# Patient Record
Sex: MALE | Race: BLACK OR AFRICAN AMERICAN
[De-identification: names, ages, dates, MRNs, and addresses within clinical notes are randomized per-mention and may not be internally consistent; named-entity substitution may affect disease eponyms.]

---

## 2018-05-29 ENCOUNTER — HOSPITAL ENCOUNTER (OUTPATIENT)
Dept: HOSPITAL 61 - KCIC MRI | Age: 78
Discharge: HOME | End: 2018-05-29
Attending: INTERNAL MEDICINE
Payer: MEDICARE

## 2018-05-29 DIAGNOSIS — R41.3: Primary | ICD-10-CM

## 2018-05-29 PROCEDURE — 70450 CT HEAD/BRAIN W/O DYE: CPT

## 2021-06-19 ENCOUNTER — HOSPITAL ENCOUNTER (INPATIENT)
Dept: HOSPITAL 61 - ER | Age: 81
LOS: 1 days | Discharge: HOME HEALTH | DRG: 73 | End: 2021-06-20
Attending: INTERNAL MEDICINE | Admitting: INTERNAL MEDICINE
Payer: MEDICARE

## 2021-06-19 VITALS — DIASTOLIC BLOOD PRESSURE: 51 MMHG | SYSTOLIC BLOOD PRESSURE: 102 MMHG

## 2021-06-19 VITALS — DIASTOLIC BLOOD PRESSURE: 45 MMHG | SYSTOLIC BLOOD PRESSURE: 115 MMHG

## 2021-06-19 VITALS — HEIGHT: 71 IN | BODY MASS INDEX: 20.96 KG/M2 | WEIGHT: 149.69 LBS

## 2021-06-19 VITALS — DIASTOLIC BLOOD PRESSURE: 72 MMHG | SYSTOLIC BLOOD PRESSURE: 129 MMHG

## 2021-06-19 VITALS — SYSTOLIC BLOOD PRESSURE: 91 MMHG | DIASTOLIC BLOOD PRESSURE: 42 MMHG

## 2021-06-19 VITALS — DIASTOLIC BLOOD PRESSURE: 56 MMHG | SYSTOLIC BLOOD PRESSURE: 121 MMHG

## 2021-06-19 VITALS — DIASTOLIC BLOOD PRESSURE: 62 MMHG | SYSTOLIC BLOOD PRESSURE: 123 MMHG

## 2021-06-19 VITALS — DIASTOLIC BLOOD PRESSURE: 65 MMHG | SYSTOLIC BLOOD PRESSURE: 132 MMHG

## 2021-06-19 DIAGNOSIS — M47.816: ICD-10-CM

## 2021-06-19 DIAGNOSIS — H40.9: ICD-10-CM

## 2021-06-19 DIAGNOSIS — Z82.49: ICD-10-CM

## 2021-06-19 DIAGNOSIS — M19.011: ICD-10-CM

## 2021-06-19 DIAGNOSIS — I25.5: ICD-10-CM

## 2021-06-19 DIAGNOSIS — Z82.5: ICD-10-CM

## 2021-06-19 DIAGNOSIS — M16.11: ICD-10-CM

## 2021-06-19 DIAGNOSIS — I11.0: ICD-10-CM

## 2021-06-19 DIAGNOSIS — Z95.5: ICD-10-CM

## 2021-06-19 DIAGNOSIS — M19.042: ICD-10-CM

## 2021-06-19 DIAGNOSIS — M19.041: ICD-10-CM

## 2021-06-19 DIAGNOSIS — Z83.3: ICD-10-CM

## 2021-06-19 DIAGNOSIS — E53.8: ICD-10-CM

## 2021-06-19 DIAGNOSIS — G93.41: ICD-10-CM

## 2021-06-19 DIAGNOSIS — I65.22: ICD-10-CM

## 2021-06-19 DIAGNOSIS — I65.29: ICD-10-CM

## 2021-06-19 DIAGNOSIS — I50.42: ICD-10-CM

## 2021-06-19 DIAGNOSIS — E78.5: ICD-10-CM

## 2021-06-19 DIAGNOSIS — I25.10: ICD-10-CM

## 2021-06-19 DIAGNOSIS — F03.91: ICD-10-CM

## 2021-06-19 DIAGNOSIS — Z95.810: ICD-10-CM

## 2021-06-19 DIAGNOSIS — I25.2: ICD-10-CM

## 2021-06-19 DIAGNOSIS — D69.6: ICD-10-CM

## 2021-06-19 DIAGNOSIS — G90.8: Primary | ICD-10-CM

## 2021-06-19 LAB
ALBUMIN SERPL-MCNC: 4.6 G/DL (ref 3.4–5)
ALBUMIN/GLOB SERPL: 1.5 {RATIO} (ref 1–1.7)
ALP SERPL-CCNC: 70 U/L (ref 46–116)
ALT SERPL-CCNC: 29 U/L (ref 16–63)
ANION GAP SERPL CALC-SCNC: 13 MMOL/L (ref 6–14)
APTT PPP: YELLOW S
AST SERPL-CCNC: 24 U/L (ref 15–37)
BACTERIA #/AREA URNS HPF: (no result) /HPF
BASOPHILS # BLD AUTO: 0 X10^3/UL (ref 0–0.2)
BASOPHILS NFR BLD: 1 % (ref 0–3)
BILIRUB SERPL-MCNC: 1.5 MG/DL (ref 0.2–1)
BILIRUB UR QL STRIP: NEGATIVE
BUN SERPL-MCNC: 22 MG/DL (ref 8–26)
BUN/CREAT SERPL: 20 (ref 6–20)
CALCIUM SERPL-MCNC: 9.7 MG/DL (ref 8.5–10.1)
CHLORIDE SERPL-SCNC: 104 MMOL/L (ref 98–107)
CO2 SERPL-SCNC: 25 MMOL/L (ref 21–32)
CREAT SERPL-MCNC: 1.1 MG/DL (ref 0.7–1.3)
EOSINOPHIL NFR BLD: 0.1 X10^3/UL (ref 0–0.7)
EOSINOPHIL NFR BLD: 2 % (ref 0–3)
ERYTHROCYTE [DISTWIDTH] IN BLOOD BY AUTOMATED COUNT: 13.3 % (ref 11.5–14.5)
FIBRINOGEN PPP-MCNC: CLEAR MG/DL
GFR SERPLBLD BASED ON 1.73 SQ M-ARVRAT: 78.1 ML/MIN
GLUCOSE SERPL-MCNC: 108 MG/DL (ref 70–99)
HCT VFR BLD CALC: 42.7 % (ref 39–53)
HGB BLD-MCNC: 14.9 G/DL (ref 13–17.5)
LYMPHOCYTES # BLD: 2.7 X10^3/UL (ref 1–4.8)
LYMPHOCYTES NFR BLD AUTO: 33 % (ref 24–48)
MCH RBC QN AUTO: 32 PG (ref 25–35)
MCHC RBC AUTO-ENTMCNC: 35 G/DL (ref 31–37)
MCV RBC AUTO: 93 FL (ref 79–100)
MONO #: 0.7 X10^3/UL (ref 0–1.1)
MONOCYTES NFR BLD: 9 % (ref 0–9)
NEUT #: 4.7 X10^3/UL (ref 1.8–7.7)
NEUTROPHILS NFR BLD AUTO: 57 % (ref 31–73)
NITRITE UR QL STRIP: NEGATIVE
PH UR STRIP: 6 [PH]
PLATELET # BLD AUTO: 119 X10^3/UL (ref 140–400)
POTASSIUM SERPL-SCNC: 4.4 MMOL/L (ref 3.5–5.1)
PROT SERPL-MCNC: 7.7 G/DL (ref 6.4–8.2)
PROT UR STRIP-MCNC: NEGATIVE MG/DL
RBC # BLD AUTO: 4.6 X10^6/UL (ref 4.3–5.7)
RBC #/AREA URNS HPF: (no result) /HPF (ref 0–2)
SODIUM SERPL-SCNC: 142 MMOL/L (ref 136–145)
UROBILINOGEN UR-MCNC: 0.2 MG/DL
WBC # BLD AUTO: 8.2 X10^3/UL (ref 4–11)
WBC #/AREA URNS HPF: (no result) /HPF (ref 0–4)

## 2021-06-19 PROCEDURE — 80053 COMPREHEN METABOLIC PANEL: CPT

## 2021-06-19 PROCEDURE — 81001 URINALYSIS AUTO W/SCOPE: CPT

## 2021-06-19 PROCEDURE — 93880 EXTRACRANIAL BILAT STUDY: CPT

## 2021-06-19 PROCEDURE — 82962 GLUCOSE BLOOD TEST: CPT

## 2021-06-19 PROCEDURE — 83735 ASSAY OF MAGNESIUM: CPT

## 2021-06-19 PROCEDURE — 93005 ELECTROCARDIOGRAM TRACING: CPT

## 2021-06-19 PROCEDURE — G0378 HOSPITAL OBSERVATION PER HR: HCPCS

## 2021-06-19 PROCEDURE — 70450 CT HEAD/BRAIN W/O DYE: CPT

## 2021-06-19 PROCEDURE — P9612 CATHETERIZE FOR URINE SPEC: HCPCS

## 2021-06-19 PROCEDURE — 85025 COMPLETE CBC W/AUTO DIFF WBC: CPT

## 2021-06-19 PROCEDURE — 87086 URINE CULTURE/COLONY COUNT: CPT

## 2021-06-19 PROCEDURE — 80048 BASIC METABOLIC PNL TOTAL CA: CPT

## 2021-06-19 PROCEDURE — 84484 ASSAY OF TROPONIN QUANT: CPT

## 2021-06-19 PROCEDURE — 36415 COLL VENOUS BLD VENIPUNCTURE: CPT

## 2021-06-19 RX ADMIN — LISINOPRIL SCH MG: 10 TABLET ORAL at 11:00

## 2021-06-19 RX ADMIN — CARVEDILOL SCH MG: 12.5 TABLET, FILM COATED ORAL at 17:00

## 2021-06-19 RX ADMIN — TAMSULOSIN HYDROCHLORIDE SCH MG: 0.4 CAPSULE ORAL at 17:17

## 2021-06-19 RX ADMIN — MULTIPLE VITAMINS W/ MINERALS TAB SCH TAB: TAB at 11:19

## 2021-06-19 RX ADMIN — Medication SCH MG: at 14:24

## 2021-06-19 RX ADMIN — Medication SCH MG: at 22:00

## 2021-06-19 RX ADMIN — VITAMIN D, TAB 1000IU (100/BT) SCH UNIT: 25 TAB at 11:20

## 2021-06-19 RX ADMIN — CARVEDILOL SCH MG: 12.5 TABLET, FILM COATED ORAL at 13:31

## 2021-06-19 RX ADMIN — ASPIRIN 81 MG SCH MG: 81 TABLET ORAL at 11:20

## 2021-06-19 RX ADMIN — CYANOCOBALAMIN TAB 1000 MCG SCH MCG: 1000 TAB at 11:19

## 2021-06-19 RX ADMIN — MEMANTINE HYDROCHLORIDE SCH MG: 5 TABLET ORAL at 21:00

## 2021-06-19 RX ADMIN — LISINOPRIL SCH MG: 10 TABLET ORAL at 21:00

## 2021-06-19 RX ADMIN — MEMANTINE HYDROCHLORIDE SCH MG: 5 TABLET ORAL at 13:30

## 2021-06-19 RX ADMIN — CETIRIZINE HYDROCHLORIDE SCH MG: 10 TABLET, FILM COATED ORAL at 11:25

## 2021-06-19 NOTE — PDOC2
CONSULT


Date of Consult


Date of Consult


DATE: 6/19/21 


TIME: 20:35





Reason for Consult


Reason for Consult:


AMS





Identification/Chief Complaint


Chief Complaint


AMS





History of Present Illness


Reason for Visit:





This patient is 80-year-old man with past medical history of multiple medical 

problems with history of dementia, episodes of agitation.  Patient was not 

feeling well for the last two-to-three days prior to presentation.  Patient was 

having more confusion.  Patient had a syncope episode.  Patient had improvement 

in symptoms.  Patient currently denies any complaint of headache nausea or 

vomiting chest pain shortness of breath.  Patient denied any focal extremity 

weakness.  Patient had a CT scan done brain did not show any evidence of acute 

intracranial etiology changes noted for chronic small vessel ischemic disease, 

atrophy noted





Current Problem List


Problem List


Problems


Medical Problems:


(1) Altered mental status


Status: Acute  











Current Medications


Current Medications





Current Medications


Aspirin (Aspirin Chewable) 81 mg DAILY PO  Last administered on 6/19/21at 11:20;

 Start 6/19/21 at 11:00


Cetirizine HCl (ZyrTEC) 10 mg DAILY PO  Last administered on 6/19/21at 11:25;  

Start 6/19/21 at 11:00


Donepezil HCl (Aricept) 10 mg HS PO ;  Start 6/19/21 at 21:00;  Stop 6/19/21 at 

12:10;  Status DC


Latanoprost (Xalatan) 1 drop QHS OU ;  Start 6/19/21 at 21:00


Lisinopril (Prinivil) 10 mg BID PO ;  Start 6/19/21 at 11:00


Simvastatin (Zocor) 20 mg HS PO ;  Start 6/19/21 at 21:00


Tamsulosin HCl (Flomax) 0.4 mg DAILYWSUP PO  Last administered on 6/19/21at 

17:17;  Start 6/19/21 at 17:00


Carvedilol (Coreg) 25 mg BIDWMEALS PO  Last administered on 6/19/21at 13:31;  

Start 6/19/21 at 12:00


Cyanocobalamin (Vitamin B-12) 1,000 mcg DAILY PO  Last administered on 6/19/21at

11:19;  Start 6/19/21 at 11:00


Non-Formulary Medication (Mexiletine Hcl ) 150 mg Q8HRS PO  Last administered on

6/19/21at 14:24;  Start 6/19/21 at 14:00


Multivitamins (Thera M Plus) 1 tab DAILY PO  Last administered on 6/19/21at 

11:19;  Start 6/19/21 at 11:00


Vitamin D (Vitamin D3) 2,000 unit DAILY PO ;  Start 6/20/21 at 09:00;  Stop 

6/19/21 at 10:07;  Status DC


Vitamin D (Vitamin D3) 2,000 unit DAILY PO  Last administered on 6/19/21at 

11:20;  Start 6/19/21 at 10:15


Donepezil HCl (Aricept) 20 mg HS PO ;  Start 6/19/21 at 21:00


Memantine (Namenda) 5 mg BID PO  Last administered on 6/19/21at 13:30;  Start 

6/19/21 at 13:00


Alprazolam (Xanax) 0.25 mg PRN Q8HRS  PRN PO ANXIETY / AGITATION Last 

administered on 6/19/21at 17:17;  Start 6/19/21 at 16:30





Active Scripts


Active


Reported


Vitamin D3 ** (Vitamin D) 25 Mcg Tablet 2,000 Intlu PO DAILY


     1,000 UNITS = 25 MCG


Cetirizine Hcl 10 Mg Tablet 10 Mg PO DAILY


Lisinopril 10 Mg Tablet 10 Mg PO BID


Flomax (Tamsulosin Hcl) 0.4 Mg Cap.er.24h 0.4 Mg PO DAILYWSUP


Flomax (Tamsulosin Hcl) 0.4 Mg Cap.er.24h 0.4 Mg PO DAILY


Mexiletine Hcl 150 Mg Capsule 150 Mg PO TID


Xalatan (Latanoprost) 2.5 Ml Drops 1 Drop OU QHS


Carvedilol 25 Mg Tablet 25 Mg PO BIDWMEALS


Donepezil Hcl 10 Mg Tablet 10 Mg PO HS


Simvastatin 20 Mg Tablet 20 Mg PO HS


Vitamin B-12 (Cyanocobalamin (Vitamin B-12)) 1,000 Mcg Capsule 1 Cap PO DAILY 30

Days


Centrum Silver Men Tablet (Multivit-Min/FA/Lycopen/Lutein) 1 Each Tablet 1 Each 

PO DAILY


Aspirin 81 Mg Tab.chew 81 Mg PO DAILY





Allergies


Allergies:  


Coded Allergies:  


     No Known Drug Allergies (Unverified , 6/19/21)





Physical Exam


Physical Exam


General no acute distress.


HEENT: Normocephalic and atraumatic. 


NECK: Supple without bruit


Respiratory: Clear to auscultation bilaterally


Heart: Regular rate and rhythm, S1S2 normal


NEUROLOGIC: Mental status Alert Able to tell his name.  No meningeal signs.


Cranial nerve equally reactive pupils, and intact extraocular movements. 


No facial asymmetry. 


Palate elevates and tongue protrudes in midline. 


Reflexes are 1-2 with flexor plantar responses. 


Coordination no dysmetria


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 


Sensory exam is intact for light touch and pinprick.


Gait in bed.


A 10-point review of systems was obtained. Other than the history of present 

illness the remainder of the review of systems is negative.





Vitals


VITALS





Vital Signs








  Date Time  Temp Pulse Resp B/P (MAP) Pulse Ox O2 Delivery O2 Flow Rate FiO2


 


6/19/21 19:15 97.9 68 18 91/42 (58) 98 Room Air  





 97.9       











Labs


Labs





Laboratory Tests








Test


 6/19/21


03:50 6/19/21


03:54 6/19/21


04:00


 


Urine Collection Type U cath   


 


Urine Color Yellow   


 


Urine Clarity Clear   


 


Urine pH 6.0 (<5.0-8.0)   


 


Urine Specific Gravity


 >=1.030


(1.000-1.030) 


 





 


Urine Protein


 Negative mg/dL


(NEG-TRACE) 


 





 


Urine Glucose (UA)


 Negative mg/dL


(NEG) 


 





 


Urine Ketones (Stick)


 Negative mg/dL


(NEG) 


 





 


Urine Blood Negative (NEG)   


 


Urine Nitrite Negative (NEG)   


 


Urine Bilirubin Negative (NEG)   


 


Urine Urobilinogen Dipstick


 0.2 mg/dL (0.2


mg/dL) 


 





 


Urine Leukocyte Esterase Trace (NEG)   


 


Urine RBC 1-2 /HPF (0-2)   


 


Urine WBC


 5-10 /HPF


(0-4) 


 





 


Urine Squamous Epithelial


Cells Mod /LPF 


 


 





 


Urine Bacteria


 Moderate /HPF


(0-FEW) 


 





 


Urine Mucus Mod /LPF   


 


Glucose (Fingerstick)


 


 96 mg/dL


(70-99) 





 


White Blood Count


 


 


 8.2 x10^3/uL


(4.0-11.0)


 


Red Blood Count


 


 


 4.60 x10^6/uL


(4.30-5.70)


 


Hemoglobin


 


 


 14.9 g/dL


(13.0-17.5)


 


Hematocrit


 


 


 42.7 %


(39.0-53.0)


 


Mean Corpuscular Volume   93 fL () 


 


Mean Corpuscular Hemoglobin   32 pg (25-35) 


 


Mean Corpuscular Hemoglobin


Concent 


 


 35 g/dL


(31-37)


 


Red Cell Distribution Width


 


 


 13.3 %


(11.5-14.5)


 


Platelet Count


 


 


 119 x10^3/uL


(140-400)


 


Neutrophils (%) (Auto)   57 % (31-73) 


 


Lymphocytes (%) (Auto)   33 % (24-48) 


 


Monocytes (%) (Auto)   9 % (0-9) 


 


Eosinophils (%) (Auto)   2 % (0-3) 


 


Basophils (%) (Auto)   1 % (0-3) 


 


Neutrophils # (Auto)


 


 


 4.7 x10^3/uL


(1.8-7.7)


 


Lymphocytes # (Auto)


 


 


 2.7 x10^3/uL


(1.0-4.8)


 


Monocytes # (Auto)


 


 


 0.7 x10^3/uL


(0.0-1.1)


 


Eosinophils # (Auto)


 


 


 0.1 x10^3/uL


(0.0-0.7)


 


Basophils # (Auto)


 


 


 0.0 x10^3/uL


(0.0-0.2)


 


Sodium Level


 


 


 142 mmol/L


(136-145)


 


Potassium Level


 


 


 4.4 mmol/L


(3.5-5.1)


 


Chloride Level


 


 


 104 mmol/L


()


 


Carbon Dioxide Level


 


 


 25 mmol/L


(21-32)


 


Anion Gap   13 (6-14) 


 


Blood Urea Nitrogen


 


 


 22 mg/dL


(8-26)


 


Creatinine


 


 


 1.1 mg/dL


(0.7-1.3)


 


Estimated GFR


(Cockcroft-Gault) 


 


 78.1 





 


BUN/Creatinine Ratio   20 (6-20) 


 


Glucose Level


 


 


 108 mg/dL


(70-99)


 


Calcium Level


 


 


 9.7 mg/dL


(8.5-10.1)


 


Total Bilirubin


 


 


 1.5 mg/dL


(0.2-1.0)


 


Aspartate Amino Transf


(AST/SGOT) 


 


 24 U/L (15-37) 





 


Alanine Aminotransferase


(ALT/SGPT) 


 


 29 U/L (16-63) 





 


Alkaline Phosphatase


 


 


 70 U/L


()


 


Troponin I Quantitative


 


 


 < 0.017 ng/mL


(0.000-0.055)


 


Total Protein


 


 


 7.7 g/dL


(6.4-8.2)


 


Albumin


 


 


 4.6 g/dL


(3.4-5.0)


 


Albumin/Globulin Ratio   1.5 (1.0-1.7) 








Laboratory Tests








Test


 6/19/21


03:50 6/19/21


03:54 6/19/21


04:00


 


Urine Collection Type U cath   


 


Urine Color Yellow   


 


Urine Clarity Clear   


 


Urine pH 6.0 (<5.0-8.0)   


 


Urine Specific Gravity


 >=1.030


(1.000-1.030) 


 





 


Urine Protein


 Negative mg/dL


(NEG-TRACE) 


 





 


Urine Glucose (UA)


 Negative mg/dL


(NEG) 


 





 


Urine Ketones (Stick)


 Negative mg/dL


(NEG) 


 





 


Urine Blood Negative (NEG)   


 


Urine Nitrite Negative (NEG)   


 


Urine Bilirubin Negative (NEG)   


 


Urine Urobilinogen Dipstick


 0.2 mg/dL (0.2


mg/dL) 


 





 


Urine Leukocyte Esterase Trace (NEG)   


 


Urine RBC 1-2 /HPF (0-2)   


 


Urine WBC


 5-10 /HPF


(0-4) 


 





 


Urine Squamous Epithelial


Cells Mod /LPF 


 


 





 


Urine Bacteria


 Moderate /HPF


(0-FEW) 


 





 


Urine Mucus Mod /LPF   


 


Glucose (Fingerstick)


 


 96 mg/dL


(70-99) 





 


White Blood Count


 


 


 8.2 x10^3/uL


(4.0-11.0)


 


Red Blood Count


 


 


 4.60 x10^6/uL


(4.30-5.70)


 


Hemoglobin


 


 


 14.9 g/dL


(13.0-17.5)


 


Hematocrit


 


 


 42.7 %


(39.0-53.0)


 


Mean Corpuscular Volume   93 fL () 


 


Mean Corpuscular Hemoglobin   32 pg (25-35) 


 


Mean Corpuscular Hemoglobin


Concent 


 


 35 g/dL


(31-37)


 


Red Cell Distribution Width


 


 


 13.3 %


(11.5-14.5)


 


Platelet Count


 


 


 119 x10^3/uL


(140-400)


 


Neutrophils (%) (Auto)   57 % (31-73) 


 


Lymphocytes (%) (Auto)   33 % (24-48) 


 


Monocytes (%) (Auto)   9 % (0-9) 


 


Eosinophils (%) (Auto)   2 % (0-3) 


 


Basophils (%) (Auto)   1 % (0-3) 


 


Neutrophils # (Auto)


 


 


 4.7 x10^3/uL


(1.8-7.7)


 


Lymphocytes # (Auto)


 


 


 2.7 x10^3/uL


(1.0-4.8)


 


Monocytes # (Auto)


 


 


 0.7 x10^3/uL


(0.0-1.1)


 


Eosinophils # (Auto)


 


 


 0.1 x10^3/uL


(0.0-0.7)


 


Basophils # (Auto)


 


 


 0.0 x10^3/uL


(0.0-0.2)


 


Sodium Level


 


 


 142 mmol/L


(136-145)


 


Potassium Level


 


 


 4.4 mmol/L


(3.5-5.1)


 


Chloride Level


 


 


 104 mmol/L


()


 


Carbon Dioxide Level


 


 


 25 mmol/L


(21-32)


 


Anion Gap   13 (6-14) 


 


Blood Urea Nitrogen


 


 


 22 mg/dL


(8-26)


 


Creatinine


 


 


 1.1 mg/dL


(0.7-1.3)


 


Estimated GFR


(Cockcroft-Gault) 


 


 78.1 





 


BUN/Creatinine Ratio   20 (6-20) 


 


Glucose Level


 


 


 108 mg/dL


(70-99)


 


Calcium Level


 


 


 9.7 mg/dL


(8.5-10.1)


 


Total Bilirubin


 


 


 1.5 mg/dL


(0.2-1.0)


 


Aspartate Amino Transf


(AST/SGOT) 


 


 24 U/L (15-37) 





 


Alanine Aminotransferase


(ALT/SGPT) 


 


 29 U/L (16-63) 





 


Alkaline Phosphatase


 


 


 70 U/L


()


 


Troponin I Quantitative


 


 


 < 0.017 ng/mL


(0.000-0.055)


 


Total Protein


 


 


 7.7 g/dL


(6.4-8.2)


 


Albumin


 


 


 4.6 g/dL


(3.4-5.0)


 


Albumin/Globulin Ratio   1.5 (1.0-1.7) 











Assessment/Plan


Assessment/Plan








This patient is 80-year-old man with past medical history of multiple medical 

problems with history of dementia, episodes of agitation.  Patient was not 

feeling well for the last two-to-three days prior to presentation.  Patient was 

having more confusion.  Patient had a syncope episode.  Patient had improvement 

in symptoms.  Patient currently denies any complaint of headache nausea or vo

miting chest pain shortness of breath.  Patient denied any focal extremity 

weakness.  Patient had a CT scan done brain did not show any evidence of acute 

intracranial etiology changes noted for chronic small vessel ischemic disease, 

atrophy noted


With episode of syncope.  Check for orthostatic hypotension with encephalopathy 

with underlying dementia check for infectious, metabolic etiology.  Patient had 

a CT scan done brain did not show any evidence of acute intracranial etiology 

changes noted for chronic small vessel ischemic disease, atrophy noted





MRI of brain cannot be done due to current medical condition.  On aspirin for 

stroke prevention.  Check carotid Doppler.  Physical therapy.  Continue medical 

management.  Plan discussed at length





Thank you for allowing me to take part in this patient's care. Please not 

hesitate to contact me with questions. 





Transcribed using dictation device. The dictation could contain irregularities 

inherent in the voice to text conversion software, which may not be detected 

during the document review process. Please contact our office in case of any 

confusion or for any clarification, as needed.











NIKO STRANGE MD                   Jun 19, 2021 20:37

## 2021-06-19 NOTE — HP
ADMIT DATE: 06/19/2021

HISTORY OF PRESENT ILLNESS:  This is a 80 years old male who has a history of 

dementia with behavioral disturbances and who is on Aricept and who is in the 

habit of walking and pacing for many hours at night, was talking to his wife and

then subsequently went back to the chair last night around 1:00 a.m. and when 

wife went back to see him, he was not responsive.  He was sitting in the chair, 

but did not respond to any verbal or physical stimuli.  Wife called the 

ambulance and the patient did not respond even to the medical staff.  He was 

brought to the Emergency Room.  In the Emergency Room, he remained unresponsive 

for about 30 minutes.  Subsequently, the patient became responsive and went back

to his baseline status.  The patient is confused and unable to provide any 

information.  He has severe dementia.  He did not have any dizziness, seizures, 

palpitations, cough, congestion, dyspnea, cyanosis, any focal weakness per the 

patient's wife.  In the Emergency Room, a CT scan of head did not show any acute

intracranial abnormalities.  Because of the change in mental status and possible

syncope, the patient was admitted for further evaluation and management.



SYSTEMS REVIEW:  As noted in the history of present illness.  The patient is 

unable to provide any information.  The patient denies any abdominal pain, 

nausea, vomiting, cold, cough, congestion, fever, chills, any recent falls or 

head trauma.



PAST MEDICAL HISTORY:  The patient has a history of coronary artery disease with

inferior posterior myocardial infarction, moderate atherosclerosis 50-60% 

stenosis in LAD and left circumflex.  He had RCA with chronic total occlusion, 

status post PCI with LASHAUN on 03/29/2004, left ventricular dysfunction due to 

ischemic cardiomyopathy, ejection fraction 35% with both systolic and diastolic 

heart failure.  The patient had an ICD placement on 06/24/2005 and on 

09/15/2015, there was an ICD generator change.  History of frequent PVCs, 

hypertension, dyslipidemia, dementia with behavioral disturbances, vitamin D 

deficiency, severe lumbar spondylosis, right shoulder osteoarthritis, chronic 

osteoarthritis of hands, moderate mitral regurgitation with mild tricuspid 

regurgitation, cardiac arrhythmia, hyperlipidemia, glaucoma, degenerative joint 

disease of the right hip, carotid artery disease, left internal carotid stenosis

less than 50%, history of cardiac pacemaker.  The patient has AICD in the right 

chest.  The patient has a history of B12 deficiency.



FAMILY HISTORY:  Brother had premature coronary artery disease.  Father had 

diabetes mellitus type 2, renal failure, and glaucoma.  Mother had coronary 

artery disease.  Sister has asthma.



SOCIAL HISTORY:  No history of smoking, alcoholism, or drug abuse.  The patient 

lives with his wife.



MEDICATIONS:  Carvedilol 25 mg p.o. twice daily, cetirizine 10 mg half tablet 

daily at bedtime, tamsulosin 0.4 mg daily, simvastatin 20 mg daily, lisinopril 

20 mg daily, Namenda 5 mg twice daily, Aricept 10 mg 2 tablets daily in the 

evening, Flonase 2 sprays by intranasal route every day in each nostril, vitamin

B12 5000 mcg sublingually once a day, mexiletine 150 mg every 8 hours with food,

aspirin 81 mg p.o. daily, vitamin D3 once a day.



ALLERGIES:  None known any.



PHYSICAL EXAMINATION:

VITAL SIGNS:  Temperature 98.1, pulse 59 per minute, respirations 16 per minute,

blood pressure is 102/51 mmHg. On admission, temperature was 97.1, respirations 

normal, pulse was 78 per minute, blood pressure 141/93 mmHg.

GENERAL:  The patient is an elderly -American male who is alert, 

confused, more talkative this morning than usual and not in acute distress.  He 

is unable to provide any significant information.

SKIN:  Warm and dry.  There is no cyanosis.

EYES:  Pupils reacting to light.  Conjunctivae pale.  Sclerae white.

HENT:  Unremarkable.

NECK:  Supple.  JVP normal.  No thyromegaly.  Trachea midline.

LUNGS:  Clear.

CARDIOVASCULAR SYSTEM:  S1, S2 regular.  The patient also has an ICD placed in 

the right upper chest.

ABDOMEN:  Soft, nontender, no guarding, no rigidity.  Bowel sounds present.

EXTREMITIES:  No edema, no cyanosis, no calf tenderness.

CENTRAL NERVOUS SYSTEM:  The patient is very confused.  Moves all his 

extremities.  No focal neurovascular deficit noted.  He is back to his baseline 

status.



LABORATORY AND DIAGNOSTIC DATA:  Laboratory findings:  WBC count 8.2, hemoglobin

14.9, platelet count is 119,000.  Sodium 142, potassium 4.4, BUN 22, creatinine 

1.1, glucose 108, calcium 9.7, total bilirubin 1.5, troponin less than 0.017, 

total protein 7.7, albumin 4.6.  Urinalysis: Moderate bacteria, 5-10 wbc's, 

nitrite negative, blood negative.  CT scan of the head did not show any acute 

intracranial changes.



IMPRESSION:

1.  Syncope.

2.  Change in mental status.

3.  Dementia with behavioral disturbances.

4.  Hypertension.

5.  Coronary artery disease with status post stent in the right coronary artery.

6.  Severe lumbar spondylosis.

7.  Right shoulder osteoarthritis.

8.  Osteoarthritis of hands and right hip.

9.  History of premature ventricular complexes.

10.  Congestive heart failure with ejection fraction of 35%, both systolic and 

diastolic.

11.  Carotid artery stenosis, less than 50% on the left side.

12.  Hyperlipidemia.

13.  Glaucoma.

14.  Thrombocytopenia.

15.  B12 deficiency.

16.  History of inferior posterior myocardial infarction.



PLAN:  Consult Dr. Yanez for neurology evaluation and management.  Consult 

Dr. Tom for cardiology evaluation and management.  Continue cardiac 

monitoring.  Condition, treatment options extensively discussed with the patient

and his wife.  Clinically, the patient does not have urinary tract infection, he

is not symptomatic but we will wait for the urine culture, I will not start him 

on any antibiotics.  For details, please refer to the orders.  Monitor for 

arrhythmia and seizures.







SUKHJINDER/ORL

DR: SUKHJINDER/frank   DD: 06/19/2021 12:07

DT: 06/19/2021 13:34   TID: [id number]

## 2021-06-19 NOTE — PDOC
Provider Note


Date of Service:


DATE: 6/19/21 


TIME: 10:55


Provider Note


Patient examined.  Discussed with the patient and his wife.  Clinically does not

have UTI.


Consult Dr. Yanez for neurology evaluation and management.  Continue cardiac

monitoring.


H&P will be dictated later.





Justifications for Admission


Other Justification














BORIS YIN MD              Jun 19, 2021 10:56

## 2021-06-19 NOTE — RAD
CT head without contrast:



Reason for examination: Altered mental status.



Comparison is made to previous study dated 5/29/2018.



Helical images were obtained through the brain with no contrast administered. Reconstruction was perf
ormed in sagittal and coronal planes.



Exposure: One or more of the following individualized dose reduction techniques were utilized for thi
s examination:  1. Automated exposure control  2. Adjustment of the mA and/or kV according to patient
 size  3. Use of iterative reconstruction technique.



Ventricular systems are symmetric and not dilated. No midline shift is seen. There is no evidence of 
intracranial hemorrhage, infarct, mass or edema. No abnormalities of seen at the orbits. The paranasa
l sinuses and mastoid air cells are clear. No acute abnormality seen in the skull.



IMPRESSION:



No acute intracranial abnormality evident.



Electronically signed by: Dorie Ballard MD (6/19/2021 5:06 AM) LINDSAY

## 2021-06-19 NOTE — EKG
[facility]

              [address] [place] [address]

Test Date:    2021               Test Time:    03:33:34

Pat Name:     KAILEY TEMPLETON           Department:   

Patient ID:   [id number]           Room:          

Gender:       M                        Technician:   

:          [id number]               Requested By: CHRISTIE PROCTOR

Order Number: [id number]           Reading MD:     

                                 Measurements

Intervals                              Axis          

Rate:         102                      P:            -61

OK:           162                      QRS:          76

QRSD:         114                      T:            -50

QT:           354                                    

QTc:          466                                    

                           Interpretive Statements

SINUS TACHYCARDIA

ST & T ABNORMALITY, CONSIDER

INFERIOR ISCHEMIA OR LEFT VENTRICULAR STRAIN

ABNORMAL ECG

RI6.02

No previous ECG available for comparison

## 2021-06-19 NOTE — PHYS DOC
General Adult


EDM:


Chief Complaint:  ALTERED MENTAL STATUS





HPI:


HPI:





Patient is a 80  year old  male with a past medical history of dementia presents

for evaluation of altered mental status.


Prior to arrival patients wife found patient to be unresponsive.


Wife could not get patient to respond to his name or physical stimuli.





On arrival patient with decreased LOC.


GCS 11 


E(3)


V(2)


M(6)





Patient was protecting his airway.





Approximately 30 minutes after arrival patients mental status returned to 

baseline


GCS 15





Patient had no complaints.


Denied headache, chest pain, shortness of breath, and abdominal pain.





Review of Systems:


Review of Systems:


Unable to obtain due to medical condition





Heart Score:


C/O Chest Pain:  N/A


Risk Factors:


Risk Factors:  DM, Current or recent (<one month) smoker, HTN, HLP, family 

history of CAD, obesity.


Risk Scores:


Score 0 - 3:  2.5% MACE over next 6 weeks - Discharge Home


Score 4 - 6:  20.3% MACE over next 6 weeks - Admit for Clinical Observation


Score 7 - 10:  72.7% MACE over next 6 weeks - Early Invasive Strategies





Physical Exam:


PE:


General: decreased LOC


Skin: warm, dry and intact.


HENT: bilateral external ears normal, oropharynx moist, nose normal.


Head::  Normocephalic, atraumatic.


Neck:   Trachea midline.


Eyes: No drainage


CARDIOVASCULAR:  Regular rate and rhythm


RESPIRATORY:  No respiratory distress


Back:  Full range of motion.


Skin: Warm, dry, no erythema, no rash. 


GASTROINTESTINAL: Abdomen soft without rebound or guarding.


NEUROLOGICAL:  Alert   No neurological deficits observed


Psychiatric:  Cooperative.





EKG:


EKG:





Performed at 0333


Rate 102


Sinus tachycardia


No ST elevation


No ST depression


No acute MI


[]





Radiology/Procedures:


Radiology/Procedures:


[]


Impression:


CT head without contrast:





Reason for examination: Altered mental status.





Comparison is made to previous study dated 5/29/2018.





Helical images were obtained through the brain with no contrast administered. 

Reconstruction was performed in sagittal and coronal planes.





Exposure: One or more of the following individualized dose reduction techniques 

were utilized for this examination:  1. Automated exposure control  2. 

Adjustment of the mA and/or kV according to patient size  3. Use of iterative 

reconstruction technique.





Ventricular systems are symmetric and not dilated. No midline shift is seen. 

There is no evidence of intracranial hemorrhage, infarct, mass or edema. No 

abnormalities of seen at the orbits. The paranasal sinuses and mastoid air cells

 are clear. No acute abnormality seen in the skull.





IMPRESSION:





No acute intracranial abnormality evident.





Course & Med Decision Making:


Course & Med Decision Making


Pertinent Labs and Imaging studies reviewed. (See chart for details)





[]Patient worked up.


Labs and CT without acute abnormalities.





Patient back to baseline mental status.





Dragon Disclaimer:


Dragon Disclaimer:


This electronic medical record was generated, in whole or in part, using a voice

 recognition dictation system.





Departure


Departure


Impression:  


   Primary Impression:  


   Altered mental status


Disposition:  09 ADMITTED AS INPATIENT


Referrals:  


NO PCP (PCP)











CHRISTIE PROCTOR DO            Jun 19, 2021 03:49

## 2021-06-19 NOTE — NUR
patient arrived to unit at approx 0650 accompanied by ED RN. patient has not complaints of 
pain. Resting comfortably on RA. Wife states that patient is back to baseline 
neurologically. Bed in low locked position, call light in reach, reminded to call for 
assistance before ambulating, bed alarm set. Will continue to monitor.

## 2021-06-20 VITALS — SYSTOLIC BLOOD PRESSURE: 112 MMHG | DIASTOLIC BLOOD PRESSURE: 57 MMHG

## 2021-06-20 VITALS — DIASTOLIC BLOOD PRESSURE: 57 MMHG | SYSTOLIC BLOOD PRESSURE: 112 MMHG

## 2021-06-20 VITALS — SYSTOLIC BLOOD PRESSURE: 134 MMHG | DIASTOLIC BLOOD PRESSURE: 70 MMHG

## 2021-06-20 VITALS — DIASTOLIC BLOOD PRESSURE: 77 MMHG | SYSTOLIC BLOOD PRESSURE: 141 MMHG

## 2021-06-20 VITALS — SYSTOLIC BLOOD PRESSURE: 118 MMHG | DIASTOLIC BLOOD PRESSURE: 63 MMHG

## 2021-06-20 LAB
ANION GAP SERPL CALC-SCNC: 7 MMOL/L (ref 6–14)
BUN SERPL-MCNC: 19 MG/DL (ref 8–26)
CALCIUM SERPL-MCNC: 9.4 MG/DL (ref 8.5–10.1)
CHLORIDE SERPL-SCNC: 105 MMOL/L (ref 98–107)
CO2 SERPL-SCNC: 31 MMOL/L (ref 21–32)
CREAT SERPL-MCNC: 1 MG/DL (ref 0.7–1.3)
GFR SERPLBLD BASED ON 1.73 SQ M-ARVRAT: 87.2 ML/MIN
GLUCOSE SERPL-MCNC: 99 MG/DL (ref 70–99)
MAGNESIUM SERPL-MCNC: 2.4 MG/DL (ref 1.8–2.4)
POTASSIUM SERPL-SCNC: 3.9 MMOL/L (ref 3.5–5.1)
SODIUM SERPL-SCNC: 143 MMOL/L (ref 136–145)

## 2021-06-20 PROCEDURE — 4B02XSZ MEASUREMENT OF CARDIAC PACEMAKER, EXTERNAL APPROACH: ICD-10-PCS | Performed by: INTERNAL MEDICINE

## 2021-06-20 RX ADMIN — CETIRIZINE HYDROCHLORIDE SCH MG: 10 TABLET, FILM COATED ORAL at 09:28

## 2021-06-20 RX ADMIN — MEMANTINE HYDROCHLORIDE SCH MG: 5 TABLET ORAL at 09:28

## 2021-06-20 RX ADMIN — CARVEDILOL SCH MG: 12.5 TABLET, FILM COATED ORAL at 09:28

## 2021-06-20 RX ADMIN — Medication SCH MG: at 14:54

## 2021-06-20 RX ADMIN — Medication SCH MG: at 03:04

## 2021-06-20 RX ADMIN — CYANOCOBALAMIN TAB 1000 MCG SCH MCG: 1000 TAB at 09:28

## 2021-06-20 RX ADMIN — MULTIPLE VITAMINS W/ MINERALS TAB SCH TAB: TAB at 09:28

## 2021-06-20 RX ADMIN — TAMSULOSIN HYDROCHLORIDE SCH MG: 0.4 CAPSULE ORAL at 17:41

## 2021-06-20 RX ADMIN — VITAMIN D, TAB 1000IU (100/BT) SCH UNIT: 25 TAB at 09:27

## 2021-06-20 RX ADMIN — LISINOPRIL SCH MG: 10 TABLET ORAL at 09:28

## 2021-06-20 RX ADMIN — ASPIRIN 81 MG SCH MG: 81 TABLET ORAL at 09:28

## 2021-06-20 RX ADMIN — CARVEDILOL SCH MG: 12.5 TABLET, FILM COATED ORAL at 17:41

## 2021-06-20 NOTE — PDOC3
IM DISCHARGE SUMMARY


Date of Admission


Date of Admission


Date of Admission:  Jun 19, 2021 at 05:21





Date of Discharge


Date of Discharge


June 20, 2021





Primary Diagnosis


Primary Diagnosis


1.  Syncope.


2.  Change in mental status.


3.  Dementia with behavioral disturbances.


4.  Hypertension.


5.  Coronary artery disease with status post stent in the right coronary artery.


6.  Severe lumbar spondylosis.


7.  Right shoulder osteoarthritis.


8.  Osteoarthritis of hands and right hip.


9.  History of premature ventricular complexes.


10.  Congestive heart failure with ejection fraction of 35%, both systolic and 


diastolic.


11.  Carotid artery stenosis, less than 50% on the left side.


12.  Hyperlipidemia.


13.  Glaucoma.


14.  Thrombocytopenia.


15.  B12 deficiency.


16.  History of inferior posterior myocardial infarction.





Consults


Consults


Waylon Tom MD; Lauro Martinez MD





Labs


Labs





                                Laboratory Tests








Test


 6/20/21


06:40


 


Sodium Level


 143 mmol/L


(136-145)


 


Potassium Level


 3.9 mmol/L


(3.5-5.1)


 


Chloride Level


 105 mmol/L


()


 


Carbon Dioxide Level


 31 mmol/L


(21-32)


 


Anion Gap 7 (6-14)  


 


Blood Urea Nitrogen


 19 mg/dL


(8-26)


 


Creatinine


 1.0 mg/dL


(0.7-1.3)


 


Estimated GFR


(Cockcroft-Gault) 87.2  





 


Glucose Level


 99 mg/dL


(70-99)


 


Calcium Level


 9.4 mg/dL


(8.5-10.1)


 


Magnesium Level


 2.4 mg/dL


(1.8-2.4)





                                Laboratory Tests


6/20/21 06:40











Brief hospital course


Brief hospital course





This is a 80 years old male who has a history of 


dementia with behavioral disturbances and who is on Aricept and who is in the 


habit of walking and pacing for many hours at night, was talking to his wife and


then subsequently went back to the chair last night around 1:00 a.m. and when 


wife went back to see him, he was not responsive.  He was sitting in the chair, 


but did not respond to any verbal or physical stimuli.  Wife called the 


ambulance and the patient did not respond even to the medical staff.  He was 


brought to the Emergency Room.  In the Emergency Room, he remained unresponsive 


for about 30 minutes.  Subsequently, the patient became responsive and went back


to his baseline status.  The patient is confused and unable to provide any 


information.  He has severe dementia.  He did not have any dizziness, seizures, 


palpitations, cough, congestion, dyspnea, cyanosis, any focal weakness per the 


patient's wife.  In the Emergency Room, a CT scan of head did not show any acute


intracranial abnormalities.  Because of the change in mental status and possible


syncope, the patient was admitted for further evaluation and management.


For more details regarding the past history, family history, social history, 

surgical history and other details, please refer to the H&P.


Consult Dr. Yanez for neurology evaluation and management.  Consult 


Dr. Tom for cardiology evaluation and management.  Continue cardiac 


monitoring.  Condition, treatment options extensively discussed with the patient


and his wife.  Clinically, the patient does not have urinary tract infection, he


is not symptomatic but we will wait for the urine culture, I will not start him 


on any antibiotics.  For details, please refer to the orders.  Monitor for 


arrhythmia and seizures.


Bilateral carotid sonogram has been ordered and the results are pending.  I 

would recommend AICD/pacemaker interrogation.


Patient has not been sleeping all night at home and here in the hospital.  Order

physical therapy Occupational Therapy evaluation and treatment.  Start 

mirtazapine 7.5 mg daily at bedtime


Condition and treatment and options including hospice extensively discussed with

the patient's son at bedside.


Family would like to take him home and not place him in a nursing home.


May discharge him later this afternoon home with home health services if okay 

with the specialists and if other tests are stable.


Long-term as well as short-term prognosis of this patient is extremely poor due 

to his multiple medical problems.


Medications


Medications reviewed and reconciled for discharge.


Home Meds


Reported Medications


Cholecalciferol (Vitamin D3) ** (Vitamin D3 **) 25 Mcg Tablet, 2000 INTLU PO 

DAILY for SUPPLEMENT, TAB


   1,000 UNITS = 25 MCG


   6/19/21


Cetirizine Hcl (CETIRIZINE HCL) 10 Mg Tablet, 10 MG PO DAILY for  , TAB


   6/19/21


Lisinopril (LISINOPRIL) 10 Mg Tablet, 10 MG PO BID for FOR HYPERTENSION, #30 TAB

0 Refills


   6/19/21


Tamsulosin Hcl (FLOMAX) 0.4 Mg Cap.er.24h, 0.4 MG PO DAILYWSUP for  , TAB


   6/19/21


Tamsulosin Hcl (FLOMAX) 0.4 Mg Cap.er.24h, 0.4 MG PO DAILY for  , TAB


   6/19/21


Mexiletine Hcl (MEXILETINE HCL) 150 Mg Capsule, 150 MG PO TID for  , CAP


   6/19/21


Latanoprost (XALATAN) 2.5 Ml Drops, 1 DROP OU QHS for GLAUCOMA, ML


   6/19/21


Carvedilol (CARVEDILOL) 25 Mg Tablet, 25 MG PO BIDWMEALS for CARDIAC, TAB


   6/19/21


Donepezil Hcl (DONEPEZIL HCL) 10 Mg Tablet, 10 MG PO HS for  , TAB


   6/19/21


Simvastatin (SIMVASTATIN) 20 Mg Tablet, 20 MG PO HS for FOR CHOLESTEROL, #30 TAB

0 Refills


   6/19/21


Cyanocobalamin (Vitamin B-12) (Vitamin B-12) 1,000 Mcg Capsule, 1 CAP PO DAILY 

for   for 30 Days, #30 CAP 0 Refills


   6/19/21


Multivit-Min/FA/Lycopen/Lutein (Centrum Silver Men Tablet) 1 Each Tablet, 1 EACH

PO DAILY for  , TAB


   6/19/21


Aspirin (ASPIRIN) 81 Mg Tab.chew, 81 MG PO DAILY for  , TAB.CHEW


   6/19/21


Allergy





                                    Allergies








Coded Allergies Type Severity Reaction Last Updated Verified


 


  No Known Drug Allergies    6/19/21 No








Follow up


in 5 days.


DISPOSITION:  Home health services


Comments


Discharge Management - 35 minutes.


For other details please refer to discharge instructions





Justicifation of Admission Dx:


Justifications for Admission:


Justification of Admission Dx:  Yes











BORIS YIN MD              Jun 20, 2021 09:31

## 2021-06-20 NOTE — SNU/HH DC
DISCHARGE WITH HOME HEALTH


DISCHARGE INFORMATION:


Final Diagnosis:


Problems


Medical Problems:


(1) Altered mental status


Status: Acute  








Condition on Discharge:  Stable





HOME HEALTH:


Face to Face:


I certify this patient is under my care and that I, or a nurse practitioner or 

physician's assistant working with me, had a face to face encounter that meets 

the physician face to face encounter requirements with this patient on June 20, 2021.


Medical Complications:  Dementia


RN For Eval/Treatment:  Yes


Physical Therapy For:  Evalulation/Treatment


Occupational Therapy For:  Evaluation/Treatment


Pt Meets Homebound Status:  Poor coordination w/ amb.





POST DISCHARGE ORDERS:


Activity Instructions for Disc:  Activity as tolerated


DIET AFTER DISCHARGE:  Regular





FOLLOW-UP:


PCP to follow Home Health:


Yes


Follow up with:  Dr. Boris Yin in 5 days.





CERTIFICATION STATEMENT:


Certification Statement:


Certification Statement: Based on the above finding, I certify that this patient

is confined to the home and needs intermittent skilled nursing care, physical 

therapy and/or speech therapy, or continues to need occupational therapy.~ This 

patient is under my care, and I have initiated the establishment of the plan of 

care.~ This patient will be followed by myself or a community physician who will

periodically review the plan of care.


Home Meds


Active Scripts


Memantine HCl (Memantine HCl) 5 Mg Tablet, 5 MG PO BID for dementia for 30 Days,

#60 TAB 3 Refills


   Prov:BORIS YIN MD         6/20/21


Mirtazapine (MIRTAZAPINE) 7.5 Mg Tablet, 7.5 MG PO QHS for anxiety for 30 Days, 

#30 TAB 3 Refills


   Prov:BORIS YIN MD         6/20/21


Donepezil Hcl (ARICEPT) 10 Mg Tablet, 20 MG PO HS for dementia for 30 Days, #60 

TAB


   Prov:BORIS YIN MD         6/20/21


Reported Medications


Cholecalciferol (Vitamin D3) ** (Vitamin D3 **) 25 Mcg Tablet, 2000 INTLU PO 

DAILY for SUPPLEMENT, TAB


   1,000 UNITS = 25 MCG


   6/19/21


Cetirizine Hcl (CETIRIZINE HCL) 10 Mg Tablet, 10 MG PO DAILY for  , TAB


   6/19/21


Lisinopril (LISINOPRIL) 10 Mg Tablet, 10 MG PO BID for FOR HYPERTENSION, #30 TAB

0 Refills


   6/19/21


Tamsulosin Hcl (FLOMAX) 0.4 Mg Cap.er.24h, 0.4 MG PO DAILYWSUP for  , TAB


   6/19/21


Mexiletine Hcl (MEXILETINE HCL) 150 Mg Capsule, 150 MG PO TID for  , CAP


   6/19/21


Latanoprost (XALATAN) 2.5 Ml Drops, 1 DROP OU QHS for GLAUCOMA, ML


   6/19/21


Carvedilol (CARVEDILOL) 25 Mg Tablet, 25 MG PO BIDWMEALS for CARDIAC, TAB


   6/19/21


Simvastatin (SIMVASTATIN) 20 Mg Tablet, 20 MG PO HS for FOR CHOLESTEROL, #30 TAB

0 Refills


   6/19/21


Cyanocobalamin (Vitamin B-12) (Vitamin B-12) 1,000 Mcg Capsule, 1 CAP PO DAILY 

for   for 30 Days, #30 CAP 0 Refills


   6/19/21


Multivit-Min/FA/Lycopen/Lutein (Centrum Silver Men Tablet) 1 Each Tablet, 1 EACH

PO DAILY for  , TAB


   6/19/21


Aspirin (ASPIRIN) 81 Mg Tab.chew, 81 MG PO DAILY for  , TAB.CHEW


   6/19/21


Discontinued Reported Medications


Tamsulosin Hcl (FLOMAX) 0.4 Mg Cap.er.24h, 0.4 MG PO DAILY for  , TAB


   6/19/21


Donepezil Hcl (DONEPEZIL HCL) 10 Mg Tablet, 10 MG PO HS for  , TAB


   6/19/21











BORIS YIN MD              Jun 20, 2021 09:38

## 2021-06-20 NOTE — RAD
EXAM: Bilateral carotid duplex with waveform analysis.



CLINICAL HISTORY: Reason: Altered Mental Status / Spl. Instructions:  / History: . .



TECHNIQUE: Longitudinal and transverse sonographic images of the bilateral carotid arteries was perfo
rmed utilizing grayscale, color and spectral Doppler techniques.



COMPARISON: None



FINDINGS:

Right Carotid: Mild intimal thickening/atheromatous plaque. 

Left Carotid: Mild intimal thickening/atheromatous plaque.   

Vertebrals: Antegrade flow bilaterally.





  Right (CM/S)  Left (CM/S)

PSV CCA  93.1  113

PSV ICA   68.4  65.4

EDV ICA  18.5  23.7

PSV ECA  67.9  57.5

ICA/CCA  0.79  0.57



IMPRESSION:

Less than 50 percent stenosis of the ICA bilaterally.



Consensus Panel Gray-scale and Doppler US Criteria for Diagnosis of ICA Stenosis

Degree of Stenosis (%)  ICA PSV

(Cm/sec)  Plaque Estimate (%)*

Normal  <125  None

<50  <125  <50

50-69  125-230  >50

>70 but 

< near occlusion  >230  >50

Near occlusion  High, low, or undetectable  Visible

Total occlusion  Undetectable  Visible, no detectable lumen

*Plaque estimate (diameter reduction) with gray-scale and color Doppler US



Degree of Stenosis (%)  ICA/CCA PSV Ratio     ICA EDV

(cm/sec)

Normal  <2.0  <40

<50  <2.0  <40

50-69  2.0-4.0  

>70 but 

< near occlusion  >4.0  >100

Near occlusion  Variable  Variable

Total occlusion  Not applicable  Not applicable



Electronically signed by: Prieto Lancaster MD (6/20/2021 3:21 PM) KATER[place]

## 2021-06-20 NOTE — PDOC2
CONSULT


Date of Consult


Date of Consult


DATE: 6/20/21 


TIME: 10:36





Reason for Consult


Reason for Consult:


Possible syncope





Referring Physician


Referring Physician:


Dr. Perez





Identification/Chief Complaint


Chief Complaint


Unresponsiveness





Source


Source:  Caregiver, Chart review





History of Present Illness


Reason for Visit:


81 y/o male with history of severe dementia, CAD, ischemic cardiomyopathy s/p 

ICD implantation was apparently found by wife unresponsive in his chair last 

night.  He remained unresponsive till 30 minutes after presentation to ED and w

ent back to baseline mental status.  It is unclear as to whether patient 

actually had syncope.  He is not a very good historian but per son who was in 

the room he did not complain of chest pain or shortness of breath.





Past Medical History


Past Medical History


CAD s/p PCI/stent to RCA 3/2004


Chronic systolic HF with EF 35%


Ischemic cardiomyopathy s/p AICD implantation 2005 with gen change 2015


HTN


HLP


Severe dementia


Glaucoma


DJD


Carotid artery stenosis





Past Surgical History


Past Surgical History


AICD implantation 2005 with gen change 2015





Family History


Family History


not contributory





Social History


Social History


Patient is a non smoker and non drinker





Current Problem List


Problem List


Problems


Medical Problems:


(1) Altered mental status


Status: Acute  











Current Medications


Current Medications





Current Medications


Aspirin (Aspirin Chewable) 81 mg DAILY PO  Last administered on 6/20/21at 09:28;

 Start 6/19/21 at 11:00


Cetirizine HCl (ZyrTEC) 10 mg DAILY PO  Last administered on 6/20/21at 09:28;  

Start 6/19/21 at 11:00


Donepezil HCl (Aricept) 10 mg HS PO ;  Start 6/19/21 at 21:00;  Stop 6/19/21 at 

12:10;  Status DC


Latanoprost (Xalatan) 1 drop QHS OU ;  Start 6/19/21 at 21:00


Lisinopril (Prinivil) 10 mg BID PO  Last administered on 6/20/21at 09:28;  Start

6/19/21 at 11:00


Simvastatin (Zocor) 20 mg HS PO ;  Start 6/19/21 at 21:00


Tamsulosin HCl (Flomax) 0.4 mg DAILYWSUP PO  Last administered on 6/19/21at 

17:17;  Start 6/19/21 at 17:00


Carvedilol (Coreg) 25 mg BIDWMEALS PO  Last administered on 6/20/21at 09:28;  

Start 6/19/21 at 12:00


Cyanocobalamin (Vitamin B-12) 1,000 mcg DAILY PO  Last administered on 6/20/21at

09:28;  Start 6/19/21 at 11:00


Non-Formulary Medication (Mexiletine Hcl ) 150 mg Q8HRS PO  Last administered on

6/20/21at 03:04;  Start 6/19/21 at 14:00


Multivitamins (Thera M Plus) 1 tab DAILY PO  Last administered on 6/20/21at 

09:28;  Start 6/19/21 at 11:00


Vitamin D (Vitamin D3) 2,000 unit DAILY PO ;  Start 6/20/21 at 09:00;  Stop 

6/19/21 at 10:07;  Status DC


Vitamin D (Vitamin D3) 2,000 unit DAILY PO  Last administered on 6/20/21at 

09:27;  Start 6/19/21 at 10:15


Donepezil HCl (Aricept) 20 mg HS PO ;  Start 6/19/21 at 21:00


Memantine (Namenda) 5 mg BID PO  Last administered on 6/20/21at 09:28;  Start 

6/19/21 at 13:00


Alprazolam (Xanax) 0.25 mg PRN Q8HRS  PRN PO ANXIETY / AGITATION Last 

administered on 6/20/21at 03:09;  Start 6/19/21 at 16:30


Mirtazapine (Remeron) 7.5 mg QHS PO ;  Start 6/20/21 at 21:00





Active Scripts


Active


Memantine HCl 5 Mg Tablet 5 Mg PO BID 30 Days


Mirtazapine 7.5 Mg Tablet 7.5 Mg PO QHS 30 Days


Aricept (Donepezil Hcl) 10 Mg Tablet 20 Mg PO HS 30 Days


Reported


Vitamin D3 ** (Vitamin D) 25 Mcg Tablet 2,000 Intlu PO DAILY


     1,000 UNITS = 25 MCG


Cetirizine Hcl 10 Mg Tablet 10 Mg PO DAILY


Lisinopril 10 Mg Tablet 10 Mg PO BID


Flomax (Tamsulosin Hcl) 0.4 Mg Cap.er.24h 0.4 Mg PO DAILYWSUP


Mexiletine Hcl 150 Mg Capsule 150 Mg PO TID


Xalatan (Latanoprost) 2.5 Ml Drops 1 Drop OU QHS


Carvedilol 25 Mg Tablet 25 Mg PO BIDWMEALS


Simvastatin 20 Mg Tablet 20 Mg PO HS


Vitamin B-12 (Cyanocobalamin (Vitamin B-12)) 1,000 Mcg Capsule 1 Cap PO DAILY 30

Days


Centrum Silver Men Tablet (Multivit-Min/FA/Lycopen/Lutein) 1 Each Tablet 1 Each 

PO DAILY


Aspirin 81 Mg Tab.chew 81 Mg PO DAILY





Allergies


Allergies:  


Coded Allergies:  


     No Known Drug Allergies (Unverified , 6/19/21)





ROS


General:  YES: Fatigue


PSYCHOLOGICAL ROS:  YES: Behavioral Disorder; 


   No: Hallucinations


Eyes:  No Loss of vision


HEENT:  No: Epistaxis


ENDOCRINE:  YES: Mood Swings


Respiratory:  No: Hemoptysis


Cardiovascular:  No Chest Pain


Gastrointestinal:  No Vomiting


Genitourinary:  No Hematuria


Neurological:  No Seizures


Skin:  No Rash





Physical Exam


General:  No acute distress


HEENT:  Atraumatic


Lungs:  Clear to auscultation


Heart:  Regular rate


Abdomen:  Soft


Extremities:  No edema





Vitals


VITALS





Vital Signs








  Date Time  Temp Pulse Resp B/P (MAP) Pulse Ox O2 Delivery O2 Flow Rate FiO2


 


6/20/21 09:28  74  141/77    


 


6/20/21 08:00      Room Air  


 


6/20/21 07:00 98.5  20  96   





 98.5       











Labs


Labs





Laboratory Tests








Test


 6/19/21


03:50 6/19/21


03:54 6/19/21


04:00 6/20/21


06:40


 


Urine Collection Type U cath    


 


Urine Color Yellow    


 


Urine Clarity Clear    


 


Urine pH 6.0 (<5.0-8.0)    


 


Urine Specific Gravity


 >=1.030


(1.000-1.030) 


 


 





 


Urine Protein


 Negative mg/dL


(NEG-TRACE) 


 


 





 


Urine Glucose (UA)


 Negative mg/dL


(NEG) 


 


 





 


Urine Ketones (Stick)


 Negative mg/dL


(NEG) 


 


 





 


Urine Blood Negative (NEG)    


 


Urine Nitrite Negative (NEG)    


 


Urine Bilirubin Negative (NEG)    


 


Urine Urobilinogen Dipstick


 0.2 mg/dL (0.2


mg/dL) 


 


 





 


Urine Leukocyte Esterase Trace (NEG)    


 


Urine RBC 1-2 /HPF (0-2)    


 


Urine WBC


 5-10 /HPF


(0-4) 


 


 





 


Urine Squamous Epithelial


Cells Mod /LPF 


 


 


 





 


Urine Bacteria


 Moderate /HPF


(0-FEW) 


 


 





 


Urine Mucus Mod /LPF    


 


Glucose (Fingerstick)


 


 96 mg/dL


(70-99) 


 





 


White Blood Count


 


 


 8.2 x10^3/uL


(4.0-11.0) 





 


Red Blood Count


 


 


 4.60 x10^6/uL


(4.30-5.70) 





 


Hemoglobin


 


 


 14.9 g/dL


(13.0-17.5) 





 


Hematocrit


 


 


 42.7 %


(39.0-53.0) 





 


Mean Corpuscular Volume   93 fL ()  


 


Mean Corpuscular Hemoglobin   32 pg (25-35)  


 


Mean Corpuscular Hemoglobin


Concent 


 


 35 g/dL


(31-37) 





 


Red Cell Distribution Width


 


 


 13.3 %


(11.5-14.5) 





 


Platelet Count


 


 


 119 x10^3/uL


(140-400) 





 


Neutrophils (%) (Auto)   57 % (31-73)  


 


Lymphocytes (%) (Auto)   33 % (24-48)  


 


Monocytes (%) (Auto)   9 % (0-9)  


 


Eosinophils (%) (Auto)   2 % (0-3)  


 


Basophils (%) (Auto)   1 % (0-3)  


 


Neutrophils # (Auto)


 


 


 4.7 x10^3/uL


(1.8-7.7) 





 


Lymphocytes # (Auto)


 


 


 2.7 x10^3/uL


(1.0-4.8) 





 


Monocytes # (Auto)


 


 


 0.7 x10^3/uL


(0.0-1.1) 





 


Eosinophils # (Auto)


 


 


 0.1 x10^3/uL


(0.0-0.7) 





 


Basophils # (Auto)


 


 


 0.0 x10^3/uL


(0.0-0.2) 





 


Sodium Level


 


 


 142 mmol/L


(136-145) 143 mmol/L


(136-145)


 


Potassium Level


 


 


 4.4 mmol/L


(3.5-5.1) 3.9 mmol/L


(3.5-5.1)


 


Chloride Level


 


 


 104 mmol/L


() 105 mmol/L


()


 


Carbon Dioxide Level


 


 


 25 mmol/L


(21-32) 31 mmol/L


(21-32)


 


Anion Gap   13 (6-14)  7 (6-14) 


 


Blood Urea Nitrogen


 


 


 22 mg/dL


(8-26) 19 mg/dL


(8-26)


 


Creatinine


 


 


 1.1 mg/dL


(0.7-1.3) 1.0 mg/dL


(0.7-1.3)


 


Estimated GFR


(Cockcroft-Gault) 


 


 78.1 


 87.2 





 


BUN/Creatinine Ratio   20 (6-20)  


 


Glucose Level


 


 


 108 mg/dL


(70-99) 99 mg/dL


(70-99)


 


Calcium Level


 


 


 9.7 mg/dL


(8.5-10.1) 9.4 mg/dL


(8.5-10.1)


 


Total Bilirubin


 


 


 1.5 mg/dL


(0.2-1.0) 





 


Aspartate Amino Transf


(AST/SGOT) 


 


 24 U/L (15-37) 


 





 


Alanine Aminotransferase


(ALT/SGPT) 


 


 29 U/L (16-63) 


 





 


Alkaline Phosphatase


 


 


 70 U/L


() 





 


Troponin I Quantitative


 


 


 < 0.017 ng/mL


(0.000-0.055) 





 


Total Protein


 


 


 7.7 g/dL


(6.4-8.2) 





 


Albumin


 


 


 4.6 g/dL


(3.4-5.0) 





 


Albumin/Globulin Ratio   1.5 (1.0-1.7)  


 


Magnesium Level


 


 


 


 2.4 mg/dL


(1.8-2.4)








Laboratory Tests








Test


 6/20/21


06:40


 


Sodium Level


 143 mmol/L


(136-145)


 


Potassium Level


 3.9 mmol/L


(3.5-5.1)


 


Chloride Level


 105 mmol/L


()


 


Carbon Dioxide Level


 31 mmol/L


(21-32)


 


Anion Gap 7 (6-14) 


 


Blood Urea Nitrogen


 19 mg/dL


(8-26)


 


Creatinine


 1.0 mg/dL


(0.7-1.3)


 


Estimated GFR


(Cockcroft-Gault) 87.2 





 


Glucose Level


 99 mg/dL


(70-99)


 


Calcium Level


 9.4 mg/dL


(8.5-10.1)


 


Magnesium Level


 2.4 mg/dL


(1.8-2.4)











Assessment/Plan


Assessment/Plan


1.  Altered mental status with unresponsiveness and possible syncope in a 

patient with severe dementia, uncertain etiology.  CT head without any acute 

process.  Neurology team following.  Plan ICD interrogation to rule out any 

significant arrhythmias.


2.  CAD s/p PCI/stent to RCA in 2004, clinically stable and chest pain free.  

Continue current secondary prevention measures


3.  Chronic systolic HF, LVEF 35%: clinically well compensated.  Continue 

current medical regimen


4.  Ischemic CMP s/p AICD implantation with more recent generator change.  

Device interrogation as stated abocve


5.  HTN: controlled


6.  HLP: statins


7.  Severe dementia: Per IM


Thank you for your consultation











DUANE BEAVERS MD           Jun 20, 2021 10:36

## 2021-06-20 NOTE — PDOC
PROGRESS NOTES


DOS:


DATE: 6/20/21 


TIME: 15:01


Assessment


Problems


Medical Problems:


(1) Altered mental status


Status: Acute  








Plan


This patient is 80-year-old man with past medical history of multiple medical 

problems with history of dementia, episodes of agitation.  Patient was not 

feeling well for the last two-to-three days prior to presentation.  Patient was 

having more confusion.  Patient had a syncope episode.  Patient had improvement 

in symptoms.  Patient currently denies any complaint of headache nausea or 

vomiting chest pain shortness of breath.  Patient denied any focal extremity 

weakness.  Patient had a CT scan done brain did not show any evidence of acute 

intracranial etiology changes noted for chronic small vessel ischemic disease, 

atrophy noted


With episode of syncope.  Check for orthostatic hypotension with encephalopathy 

with underlying dementia check for infectious, metabolic etiology.  Patient had 

a CT scan done brain did not show any evidence of acute intracranial etiology c

hanges noted for chronic small vessel ischemic disease, atrophy noted


Neuro exam stable


MRI of brain cannot be done due to current medical condition.  On aspirin for 

stroke prevention.  Check carotid Doppler.  Physical therapy.  Continue medical 

management.  Plan discussed at length


Subjective


Patient is resting in bed he is feeling better.  Family at bedside.  She reports

he appears to be at baseline


Objective





Vital Signs








  Date Time  Temp Pulse Resp B/P (MAP) Pulse Ox O2 Delivery O2 Flow Rate FiO2


 


6/20/21 10:58 98.0 64 18 118/63 (81) 96 Room Air  





 98.0       














Intake and Output 


 


 6/20/21





 07:00


 


Intake Total 650 ml


 


Output Total 250 ml


 


Balance 400 ml


 


 


 


Intake Oral 650 ml


 


Output Urine Total 250 ml


 


# Voids 3








PHYSICAL EXAM


General no acute distress.


HEENT: Normocephalic and atraumatic. 


NECK: Supple without bruit


Respiratory: Clear to auscultation bilaterally


Heart: Regular rate and rhythm, S1S2 normal


NEUROLOGIC: Mental status Alert oriented.


Cranial nerve equally reactive pupils, and intact extraocular movements. 


No facial asymmetry. 


Palate elevates and tongue protrudes in midline. 


Reflexes are 1-2 with flexor plantar responses. 


Coordination no dysmetria


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 R BKA


Sensory exam is intact for light touch and pinprick.


Gait in bed.


A 10-point review of systems was obtained. Other than the history of present 

illness the remainder of the review of systems is negative.


Review of Relevant


I have reviewed the following items amanda (where applicable) has been applied.


Labs





Laboratory Tests








Test


 6/19/21


03:50 6/19/21


03:54 6/19/21


04:00 6/20/21


06:40


 


Urine Collection Type U cath    


 


Urine Color Yellow    


 


Urine Clarity Clear    


 


Urine pH 6.0 (<5.0-8.0)    


 


Urine Specific Gravity


 >=1.030


(1.000-1.030) 


 


 





 


Urine Protein


 Negative mg/dL


(NEG-TRACE) 


 


 





 


Urine Glucose (UA)


 Negative mg/dL


(NEG) 


 


 





 


Urine Ketones (Stick)


 Negative mg/dL


(NEG) 


 


 





 


Urine Blood Negative (NEG)    


 


Urine Nitrite Negative (NEG)    


 


Urine Bilirubin Negative (NEG)    


 


Urine Urobilinogen Dipstick


 0.2 mg/dL (0.2


mg/dL) 


 


 





 


Urine Leukocyte Esterase Trace (NEG)    


 


Urine RBC 1-2 /HPF (0-2)    


 


Urine WBC


 5-10 /HPF


(0-4) 


 


 





 


Urine Squamous Epithelial


Cells Mod /LPF 


 


 


 





 


Urine Bacteria


 Moderate /HPF


(0-FEW) 


 


 





 


Urine Mucus Mod /LPF    


 


Glucose (Fingerstick)


 


 96 mg/dL


(70-99) 


 





 


White Blood Count


 


 


 8.2 x10^3/uL


(4.0-11.0) 





 


Red Blood Count


 


 


 4.60 x10^6/uL


(4.30-5.70) 





 


Hemoglobin


 


 


 14.9 g/dL


(13.0-17.5) 





 


Hematocrit


 


 


 42.7 %


(39.0-53.0) 





 


Mean Corpuscular Volume   93 fL ()  


 


Mean Corpuscular Hemoglobin   32 pg (25-35)  


 


Mean Corpuscular Hemoglobin


Concent 


 


 35 g/dL


(31-37) 





 


Red Cell Distribution Width


 


 


 13.3 %


(11.5-14.5) 





 


Platelet Count


 


 


 119 x10^3/uL


(140-400) 





 


Neutrophils (%) (Auto)   57 % (31-73)  


 


Lymphocytes (%) (Auto)   33 % (24-48)  


 


Monocytes (%) (Auto)   9 % (0-9)  


 


Eosinophils (%) (Auto)   2 % (0-3)  


 


Basophils (%) (Auto)   1 % (0-3)  


 


Neutrophils # (Auto)


 


 


 4.7 x10^3/uL


(1.8-7.7) 





 


Lymphocytes # (Auto)


 


 


 2.7 x10^3/uL


(1.0-4.8) 





 


Monocytes # (Auto)


 


 


 0.7 x10^3/uL


(0.0-1.1) 





 


Eosinophils # (Auto)


 


 


 0.1 x10^3/uL


(0.0-0.7) 





 


Basophils # (Auto)


 


 


 0.0 x10^3/uL


(0.0-0.2) 





 


Sodium Level


 


 


 142 mmol/L


(136-145) 143 mmol/L


(136-145)


 


Potassium Level


 


 


 4.4 mmol/L


(3.5-5.1) 3.9 mmol/L


(3.5-5.1)


 


Chloride Level


 


 


 104 mmol/L


() 105 mmol/L


()


 


Carbon Dioxide Level


 


 


 25 mmol/L


(21-32) 31 mmol/L


(21-32)


 


Anion Gap   13 (6-14)  7 (6-14) 


 


Blood Urea Nitrogen


 


 


 22 mg/dL


(8-26) 19 mg/dL


(8-26)


 


Creatinine


 


 


 1.1 mg/dL


(0.7-1.3) 1.0 mg/dL


(0.7-1.3)


 


Estimated GFR


(Cockcroft-Gault) 


 


 78.1 


 87.2 





 


BUN/Creatinine Ratio   20 (6-20)  


 


Glucose Level


 


 


 108 mg/dL


(70-99) 99 mg/dL


(70-99)


 


Calcium Level


 


 


 9.7 mg/dL


(8.5-10.1) 9.4 mg/dL


(8.5-10.1)


 


Total Bilirubin


 


 


 1.5 mg/dL


(0.2-1.0) 





 


Aspartate Amino Transf


(AST/SGOT) 


 


 24 U/L (15-37) 


 





 


Alanine Aminotransferase


(ALT/SGPT) 


 


 29 U/L (16-63) 


 





 


Alkaline Phosphatase


 


 


 70 U/L


() 





 


Troponin I Quantitative


 


 


 < 0.017 ng/mL


(0.000-0.055) 





 


Total Protein


 


 


 7.7 g/dL


(6.4-8.2) 





 


Albumin


 


 


 4.6 g/dL


(3.4-5.0) 





 


Albumin/Globulin Ratio   1.5 (1.0-1.7)  


 


Magnesium Level


 


 


 


 2.4 mg/dL


(1.8-2.4)








Laboratory Tests








Test


 6/20/21


06:40


 


Sodium Level


 143 mmol/L


(136-145)


 


Potassium Level


 3.9 mmol/L


(3.5-5.1)


 


Chloride Level


 105 mmol/L


()


 


Carbon Dioxide Level


 31 mmol/L


(21-32)


 


Anion Gap 7 (6-14) 


 


Blood Urea Nitrogen


 19 mg/dL


(8-26)


 


Creatinine


 1.0 mg/dL


(0.7-1.3)


 


Estimated GFR


(Cockcroft-Gault) 87.2 





 


Glucose Level


 99 mg/dL


(70-99)


 


Calcium Level


 9.4 mg/dL


(8.5-10.1)


 


Magnesium Level


 2.4 mg/dL


(1.8-2.4)








Microbiology


6/19/21 Urine Culture - Final, Complete


Medications





Current Medications


Aspirin (Aspirin Chewable) 81 mg DAILY PO  Last administered on 6/20/21at 09:28;

 Start 6/19/21 at 11:00


Cetirizine HCl (ZyrTEC) 10 mg DAILY PO  Last administered on 6/20/21at 09:28;  

Start 6/19/21 at 11:00


Donepezil HCl (Aricept) 10 mg HS PO ;  Start 6/19/21 at 21:00;  Stop 6/19/21 at 

12:10;  Status DC


Latanoprost (Xalatan) 1 drop QHS OU ;  Start 6/19/21 at 21:00


Lisinopril (Prinivil) 10 mg BID PO  Last administered on 6/20/21at 09:28;  Start

6/19/21 at 11:00


Simvastatin (Zocor) 20 mg HS PO ;  Start 6/19/21 at 21:00


Tamsulosin HCl (Flomax) 0.4 mg DAILYWSUP PO  Last administered on 6/19/21at 

17:17;  Start 6/19/21 at 17:00


Carvedilol (Coreg) 25 mg BIDWMEALS PO  Last administered on 6/20/21at 09:28;  

Start 6/19/21 at 12:00


Cyanocobalamin (Vitamin B-12) 1,000 mcg DAILY PO  Last administered on 6/20/21at

09:28;  Start 6/19/21 at 11:00


Non-Formulary Medication (Mexiletine Hcl ) 150 mg Q8HRS PO  Last administered on

6/20/21at 14:54;  Start 6/19/21 at 14:00


Multivitamins (Thera M Plus) 1 tab DAILY PO  Last administered on 6/20/21at 

09:28;  Start 6/19/21 at 11:00


Vitamin D (Vitamin D3) 2,000 unit DAILY PO ;  Start 6/20/21 at 09:00;  Stop 

6/19/21 at 10:07;  Status DC


Vitamin D (Vitamin D3) 2,000 unit DAILY PO  Last administered on 6/20/21at 

09:27;  Start 6/19/21 at 10:15


Donepezil HCl (Aricept) 20 mg HS PO ;  Start 6/19/21 at 21:00


Memantine (Namenda) 5 mg BID PO  Last administered on 6/20/21at 09:28;  Start 

6/19/21 at 13:00


Alprazolam (Xanax) 0.25 mg PRN Q8HRS  PRN PO ANXIETY / AGITATION Last 

administered on 6/20/21at 03:09;  Start 6/19/21 at 16:30


Mirtazapine (Remeron) 7.5 mg QHS PO ;  Start 6/20/21 at 21:00





Active Scripts


Active


Memantine HCl 5 Mg Tablet 5 Mg PO BID 30 Days


Mirtazapine 7.5 Mg Tablet 7.5 Mg PO QHS 30 Days


Aricept (Donepezil Hcl) 10 Mg Tablet 20 Mg PO HS 30 Days


Reported


Vitamin D3 ** (Vitamin D) 25 Mcg Tablet 2,000 Intlu PO DAILY


     1,000 UNITS = 25 MCG


Cetirizine Hcl 10 Mg Tablet 10 Mg PO DAILY


Lisinopril 10 Mg Tablet 10 Mg PO BID


Flomax (Tamsulosin Hcl) 0.4 Mg Cap.er.24h 0.4 Mg PO DAILYWSUP


Mexiletine Hcl 150 Mg Capsule 150 Mg PO TID


Xalatan (Latanoprost) 2.5 Ml Drops 1 Drop OU QHS


Carvedilol 25 Mg Tablet 25 Mg PO BIDWMEALS


Simvastatin 20 Mg Tablet 20 Mg PO HS


Vitamin B-12 (Cyanocobalamin (Vitamin B-12)) 1,000 Mcg Capsule 1 Cap PO DAILY 30

Days


Centrum Silver Men Tablet (Multivit-Min/FA/Lycopen/Lutein) 1 Each Tablet 1 Each 

PO DAILY


Aspirin 81 Mg Tab.chew 81 Mg PO DAILY


Vitals/I & O





Vital Sign - Last 24 Hours








 6/19/21 6/19/21 6/19/21 6/20/21





 19:15 20:00 22:45 03:10


 


Temp 97.9  97.6 97.6





 97.9  97.6 97.6


 


Pulse 68  69 68


 


Resp 18  18 18


 


B/P (MAP) 91/42 (58)  129/72 (91) 134/70 (91)


 


Pulse Ox 98  97 98


 


O2 Delivery Room Air Room Air Room Air Room Air


 


    





    





 6/20/21 6/20/21 6/20/21 6/20/21





 07:00 08:00 09:28 09:28


 


Temp 98.5   





 98.5   


 


Pulse 74  74 74


 


Resp 20   


 


B/P (MAP) 141/77 (98)  141/77 141/77


 


Pulse Ox 96   


 


O2 Delivery Room Air Room Air  


 


    





    





 6/20/21   





 10:58   


 


Temp 98.0   





 98.0   


 


Pulse 64   


 


Resp 18   


 


B/P (MAP) 118/63 (81)   


 


Pulse Ox 96   


 


O2 Delivery Room Air   














Intake and Output   


 


 6/19/21 6/19/21 6/20/21





 15:00 23:00 07:00


 


Intake Total 360 ml 240 ml 50 ml


 


Output Total 100 ml  150 ml


 


Balance 260 ml 240 ml -100 ml











Justicifation of Admission Dx:


Justifications for Admission:


Justification of Admission Dx:  Yes











NIKO STRANGE MD                   Jun 20, 2021 15:01